# Patient Record
(demographics unavailable — no encounter records)

---

## 2025-04-03 NOTE — PHYSICAL EXAM
[FreeTextEntry3] : Stuck on scaly plaque, L dorsal hand, R forearm L forearm with erythematous papule, central adherent scale

## 2025-04-03 NOTE — HISTORY OF PRESENT ILLNESS
[FreeTextEntry1] : NPV: spot check  [de-identified] : 87M here with son for eval of:  #Spots on arms and face x years No personal or family history of skin cancer.  Lesions asymptomatic, patient curious as to diagnosis

## 2025-04-03 NOTE — HISTORY OF PRESENT ILLNESS
[FreeTextEntry1] : NPV: spot check  [de-identified] : 87M here with son for eval of:  #Spots on arms and face x years No personal or family history of skin cancer.  Lesions asymptomatic, patient curious as to diagnosis

## 2025-04-03 NOTE — ASSESSMENT
[Use of independent historian: [ enter independent historian's relationship to patient ] :____] : As the patient was unable to provide a complete and reliable history, I obtained clinical history from the patient's [unfilled] [FreeTextEntry1] : #Actinic keratosis, L forearm #Seborrheic keratoses- R forearm #Stucco keratosis-- L hand I discussed the nature of these lesions with patient. Cryotherapy performed today to as below     The patient was informed of the pathophysiology of their lesions and their treatment course with liquid nitrogen (cryosurgery). Side effects include blister formation, hypopigmentation, and scarring, as well as permanent nail dystrophy if applicable. Patient was verbally consented and the lesions identified above were treated with liquid nitrogen freeze, thaw, freeze each cycle x 2. The patient tolerated the procedure well. Wound care instructions, care of a blister with vaseline, signs and symptoms of infection were discussed in full. The patient denies any questions at this time.   #Xerosis Dry gentle skin care discussed  Recommended moisturizers provided including cera ve healing ointment